# Patient Record
Sex: FEMALE | Race: WHITE | NOT HISPANIC OR LATINO | Employment: STUDENT | URBAN - METROPOLITAN AREA
[De-identification: names, ages, dates, MRNs, and addresses within clinical notes are randomized per-mention and may not be internally consistent; named-entity substitution may affect disease eponyms.]

---
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
## 2025-01-18 ENCOUNTER — HEALTH MAINTENANCE LETTER (OUTPATIENT)
Age: 20
End: 2025-01-18

## 2025-05-27 ENCOUNTER — MYC REFILL (OUTPATIENT)
Dept: FAMILY MEDICINE | Facility: CLINIC | Age: 20
End: 2025-05-27
Payer: COMMERCIAL

## 2025-05-27 RX ORDER — TRETINOIN 0.5 MG/G
CREAM TOPICAL
Status: CANCELLED | OUTPATIENT
Start: 2025-05-27

## 2025-05-28 NOTE — TELEPHONE ENCOUNTER
Clinic RN: Please investigate patient's chart or contact patient if the information cannot be found because pt was given ghada refill 12/4/24 and pt did not follow up with appointment

## 2025-05-30 NOTE — TELEPHONE ENCOUNTER
"Contacts       Contact Date/Time Type Contact Phone/Fax    05/27/2025 10:22 PM CDT Web (Incoming) Ghada Brown (Self)           Attempted to reach patient to: Collect additional information    Information needed: Medication is listed as patient reported \"not taking\" back in 3/2023.   - Who ordered this for the patient before?   - Why is the patient wanting to restart medication? Symptoms?   - Patient is over due for an appointment since Dec 2024 and needs to be scheduled     When patient returns call, please take this action:    Route to green RN line         (The ghada refill mentioned below was for a different medication - but patient is still due for an appointment.)  "

## 2025-06-03 NOTE — TELEPHONE ENCOUNTER
"Contacts       Contact Date/Time Type Contact Phone/Fax    05/27/2025 10:22 PM CDT Web (Incoming) Zac Brownkushal [place] (Self)     05/30/2025 02:50 PM CDT Phone (Outgoing) Ghada Brown (Self) [phone] (H)    06/03/2025 12:51 PM CDT Phone (Outgoing) Kevin Ghada [place] (Self) [phone]    Left Message           Attempted to reach patient to: Collect additional information    Information needed:  Medication is listed as patient reported \"not taking\" back in 3/2023.   - Who ordered this for the patient before?   - Why is the patient wanting to restart medication? Symptoms?   - Patient is over due for an appointment since Dec 2024 and needs to be scheduled     When patient returns call, please take this action: Route to green RN line            (The ghada refill mentioned below was for a different medication - but patient is still due for an appointment.)  "

## 2025-06-10 NOTE — TELEPHONE ENCOUNTER
"Contacts       Contact Date/Time Type Contact Phone/Fax    05/27/2025 10:22 PM CDT Web (Incoming) Michelle Brown (Self)     05/30/2025 02:50 PM CDT Phone (Outgoing) Michelle Brown (Self) [phone] (H)    06/03/2025 12:51 PM CDT Phone (Outgoing) Michelle Brown (Self) [phone]    Left Message     06/10/2025 08:10 AM CDT Phone (Outgoing) Michelle Brown (Self) [phone]    Left Message           Attempted to reach patient to: Collect additional information    Information needed: Medication is listed as patient reported \"not taking\" back in 3/2023.   - Who ordered this for the patient before?   - Why is the patient wanting to restart medication? Symptoms?   - Patient is over due for an appointment since Dec 2024 and needs to be scheduled     This is the 3rd attempt to contact patient. RN will send [employer] message to patient and close encounter.    AZEB Mendenhall  [facility]    "

## 2025-08-03 ENCOUNTER — MYC REFILL (OUTPATIENT)
Dept: FAMILY MEDICINE | Facility: CLINIC | Age: 20
End: 2025-08-03
Payer: COMMERCIAL

## 2025-08-03 DIAGNOSIS — Z86.19 HX OF COLD SORES: ICD-10-CM

## 2025-08-03 RX ORDER — VALACYCLOVIR HYDROCHLORIDE 1 G/1
1000 TABLET, FILM COATED ORAL 2 TIMES DAILY
Qty: 30 TABLET | Refills: 1 | Status: CANCELLED | OUTPATIENT
Start: 2025-08-03